# Patient Record
Sex: MALE | Race: WHITE | Employment: FULL TIME | URBAN - METROPOLITAN AREA
[De-identification: names, ages, dates, MRNs, and addresses within clinical notes are randomized per-mention and may not be internally consistent; named-entity substitution may affect disease eponyms.]

---

## 2023-05-31 ENCOUNTER — HOSPITAL ENCOUNTER (EMERGENCY)
Age: 58
Discharge: HOME | End: 2023-05-31
Payer: COMMERCIAL

## 2023-05-31 VITALS
HEIGHT: 60 IN | RESPIRATION RATE: 18 BRPM | WEIGHT: 179.9 LBS | DIASTOLIC BLOOD PRESSURE: 91 MMHG | SYSTOLIC BLOOD PRESSURE: 133 MMHG | TEMPERATURE: 98.3 F | BODY MASS INDEX: 35.32 KG/M2 | OXYGEN SATURATION: 100 % | HEART RATE: 66 BPM

## 2023-05-31 DIAGNOSIS — H10.33 ACUTE CONJUNCTIVITIS OF BOTH EYES, UNSPECIFIED ACUTE CONJUNCTIVITIS TYPE: Primary | ICD-10-CM

## 2023-05-31 PROCEDURE — 99283 EMERGENCY DEPT VISIT LOW MDM: CPT

## 2023-05-31 RX ORDER — POLYMYXIN B SULFATE AND TRIMETHOPRIM 1; 10000 MG/ML; [USP'U]/ML
1 SOLUTION OPHTHALMIC EVERY 6 HOURS
Qty: 10 ML | Refills: 0 | Status: SHIPPED | OUTPATIENT
Start: 2023-05-31 | End: 2023-06-05

## 2023-05-31 ASSESSMENT — VISUAL ACUITY
OD: 20/40
OS: 20/50
OU: 20/50

## 2023-05-31 ASSESSMENT — PAIN - FUNCTIONAL ASSESSMENT: PAIN_FUNCTIONAL_ASSESSMENT: NONE - DENIES PAIN

## 2023-06-01 NOTE — ED NOTES
Pt discharged from ED in stable condition. Discharge instruction explain, all question answered. Prescription given. Pt walked to lobby independently.        Alfonse Harada, RN  05/31/23 [id number]

## 2023-06-01 NOTE — ED PROVIDER NOTES
[facility]  EMERGENCY DEPARTMENT ENCOUNTER          PHYSICIAN ASSISTANT NOTE       Date of evaluation: 5/31/2023    Chief Complaint     Conjunctivitis      History of Present Illness     Genesis Duron is a 59 y.o. male, otherwise healthy, who presents to the ED with complaints of right eye irritation, redness and drainage that started 2 days ago and spread to involve the left eye today. He has been using lubricating eyedrops with minimal improvement. Denies having gotten anything in his eye, no chemical exposure to his knowledge. He has noted some clear rhinorrhea, otherwise no URI type symptoms. He denies headache, dizziness, lightheadedness or visual disturbance. He otherwise has no complaints    ASSESSMENT / PLAN  (MEDICAL DECISION MAKING)     INITIAL VITALS: BP: (!) 155/111, Temp: 98.3 °F (36.8 °C), Pulse: 55, Respirations: 18, SpO2: 100 %    Genesis Duron is a 59 y.o. male with complaints of right, followed by left, eye irritation, redness and drainage with exam consistent with conjunctivitis, likely viral in origin. He is however given Polytrim eyedrops for possible bacterial infection, is referred to [facility] for a recheck if he notices no improvement over the next 3 to 4 days. He is given conjunctivitis instructions. Will return for worsening. Medical Decision Making  Problems Addressed:  Acute conjunctivitis of both eyes, unspecified acute conjunctivitis type: acute illness or injury    Risk  Prescription drug management. This patient was also evaluated by the attending physician. All care plans were discussed and agreed upon. Clinical Impression     1.  Acute conjunctivitis of both eyes, unspecified acute conjunctivitis type        Disposition     PATIENT REFERRED TO:  [facility]  [phone]  Call in 3 days  if no improvement      DISCHARGE MEDICATIONS:  Discharge Medication List as of 5/31/2023 10:32 PM        START taking these medications

## 2023-06-01 NOTE — ED TRIAGE NOTES
Patient arrived in the ER with c/o pink eye. Patient states that the symptoms started Monday. Patient had eye drainage in both eyes.

## 2023-06-02 ASSESSMENT — ENCOUNTER SYMPTOMS
VOMITING: 0
SHORTNESS OF BREATH: 0
SORE THROAT: 0
COLOR CHANGE: 0
RHINORRHEA: 1
COUGH: 0
NAUSEA: 0
EYE DISCHARGE: 1
EYE REDNESS: 1
PHOTOPHOBIA: 0
ABDOMINAL PAIN: 0